# Patient Record
Sex: FEMALE | Race: WHITE | NOT HISPANIC OR LATINO | Employment: UNEMPLOYED | URBAN - METROPOLITAN AREA
[De-identification: names, ages, dates, MRNs, and addresses within clinical notes are randomized per-mention and may not be internally consistent; named-entity substitution may affect disease eponyms.]

---

## 2020-09-21 ENCOUNTER — TRANSCRIBE ORDERS (OUTPATIENT)
Dept: OBSTETRICS AND GYNECOLOGY | Facility: HOSPITAL | Age: 35
End: 2020-09-21

## 2020-09-21 DIAGNOSIS — O35.EXX0 FETAL RENAL ANOMALY, SINGLE GESTATION: Primary | ICD-10-CM

## 2020-09-21 DIAGNOSIS — O09.529 AMA (ADVANCED MATERNAL AGE) MULTIGRAVIDA 35+, UNSPECIFIED TRIMESTER: ICD-10-CM

## 2020-09-21 DIAGNOSIS — O09.899 HISTORY OF PRETERM DELIVERY, CURRENTLY PREGNANT, UNSPECIFIED TRIMESTER: ICD-10-CM

## 2020-10-02 ENCOUNTER — OFFICE VISIT (OUTPATIENT)
Dept: OBSTETRICS AND GYNECOLOGY | Facility: HOSPITAL | Age: 35
End: 2020-10-02

## 2020-10-02 ENCOUNTER — HOSPITAL ENCOUNTER (OUTPATIENT)
Dept: WOMENS IMAGING | Facility: HOSPITAL | Age: 35
Discharge: HOME | End: 2020-10-02
Admitting: NURSE PRACTITIONER

## 2020-10-02 VITALS
DIASTOLIC BLOOD PRESSURE: 66 MMHG | BODY MASS INDEX: 31.48 KG/M2 | WEIGHT: 184.4 LBS | SYSTOLIC BLOOD PRESSURE: 121 MMHG | HEIGHT: 64 IN

## 2020-10-02 DIAGNOSIS — O35.EXX0 FETAL RENAL ANOMALY, SINGLE GESTATION: ICD-10-CM

## 2020-10-02 DIAGNOSIS — O09.529 AMA (ADVANCED MATERNAL AGE) MULTIGRAVIDA 35+, UNSPECIFIED TRIMESTER: ICD-10-CM

## 2020-10-02 DIAGNOSIS — O35.EXX0 FETAL MULTICYSTIC DYSPLASTIC KIDNEY AFFECTING CARE OF MOTHER, ANTEPARTUM, SINGLE OR UNSPECIFIED FETUS: ICD-10-CM

## 2020-10-02 DIAGNOSIS — O09.899 HISTORY OF PRETERM DELIVERY, CURRENTLY PREGNANT, UNSPECIFIED TRIMESTER: ICD-10-CM

## 2020-10-02 DIAGNOSIS — O09.523 MULTIGRAVIDA OF ADVANCED MATERNAL AGE IN THIRD TRIMESTER: Primary | ICD-10-CM

## 2020-10-02 PROCEDURE — 76811 OB US DETAILED SNGL FETUS: CPT | Performed by: OBSTETRICS & GYNECOLOGY

## 2020-10-02 PROCEDURE — 76811 OB US DETAILED SNGL FETUS: CPT

## 2020-10-02 PROCEDURE — 99241 PR OFFICE CONSULTATION NEW/ESTAB PATIENT 15 MIN: CPT | Performed by: OBSTETRICS & GYNECOLOGY

## 2020-10-02 RX ORDER — NITROFURANTOIN 25; 75 MG/1; MG/1
CAPSULE ORAL 2 TIMES DAILY
COMMUNITY
Start: 2020-09-23 | End: 2020-10-30

## 2020-10-02 RX ORDER — PROMETHAZINE HYDROCHLORIDE 25 MG/1
25 TABLET ORAL EVERY 6 HOURS PRN
COMMUNITY
Start: 2020-09-18

## 2020-10-02 NOTE — PROGRESS NOTES
Documentation of the ultasound findings, images, and interpretations as well as consultation note will be available in the patient's Viewpoint report located in the Chart Review Imaging tab in [facility].

## 2020-10-02 NOTE — PROGRESS NOTES
Denies problems. Reports had NIPS with normal results. Next OB visit is 10/9/2020. Uncertain historian.

## 2020-10-30 ENCOUNTER — HOSPITAL ENCOUNTER (OUTPATIENT)
Dept: WOMENS IMAGING | Facility: HOSPITAL | Age: 35
Discharge: HOME | End: 2020-10-30
Admitting: OBSTETRICS & GYNECOLOGY

## 2020-10-30 ENCOUNTER — OFFICE VISIT (OUTPATIENT)
Dept: OBSTETRICS AND GYNECOLOGY | Facility: HOSPITAL | Age: 35
End: 2020-10-30

## 2020-10-30 VITALS — DIASTOLIC BLOOD PRESSURE: 73 MMHG | WEIGHT: 190 LBS | SYSTOLIC BLOOD PRESSURE: 127 MMHG | BODY MASS INDEX: 33.13 KG/M2

## 2020-10-30 DIAGNOSIS — O09.523 MULTIGRAVIDA OF ADVANCED MATERNAL AGE IN THIRD TRIMESTER: ICD-10-CM

## 2020-10-30 DIAGNOSIS — O35.EXX0 FETAL MULTICYSTIC DYSPLASTIC KIDNEY AFFECTING CARE OF MOTHER, ANTEPARTUM, SINGLE OR UNSPECIFIED FETUS: Primary | ICD-10-CM

## 2020-10-30 DIAGNOSIS — O35.EXX0 FETAL MULTICYSTIC DYSPLASTIC KIDNEY AFFECTING CARE OF MOTHER, ANTEPARTUM, SINGLE OR UNSPECIFIED FETUS: ICD-10-CM

## 2020-10-30 PROCEDURE — 76816 OB US FOLLOW-UP PER FETUS: CPT | Performed by: OBSTETRICS & GYNECOLOGY

## 2020-10-30 PROCEDURE — 76816 OB US FOLLOW-UP PER FETUS: CPT

## 2020-10-30 NOTE — PROGRESS NOTES
Documentation of the ultasound findings, images, and interpretations will be available in the patient's Viewpoint report located in the Chart Review Imaging tab in [facility].

## 2020-10-30 NOTE — PROGRESS NOTES
Pt denies lof, vag bleeding or cramping. States she has been taking motrin occasionally. Told pt to discuss this with provider. Sees OB 11/4.

## 2025-01-14 ENCOUNTER — TELEPHONE (OUTPATIENT)
Dept: CARDIOLOGY | Facility: CLINIC | Age: 40
End: 2025-01-14
Payer: MEDICAID

## 2025-01-14 NOTE — TELEPHONE ENCOUNTER
Called pt to [facility] cancelled appt and the pt stated that she did not need to see a cardiologist that the issue was her lungs, pt stated that she was going to get an appointment with pulmonology and did not want to reschedule the appt that was cancelled

## 2025-02-03 ENCOUNTER — PATIENT ROUNDING (BHMG ONLY) (OUTPATIENT)
Dept: CARDIOLOGY | Facility: CLINIC | Age: 40
End: 2025-02-03
Payer: MEDICAID

## 2025-02-03 ENCOUNTER — OFFICE VISIT (OUTPATIENT)
Dept: CARDIOLOGY | Facility: CLINIC | Age: 40
End: 2025-02-03
Payer: MEDICAID

## 2025-02-03 VITALS
OXYGEN SATURATION: 98 % | DIASTOLIC BLOOD PRESSURE: 78 MMHG | HEIGHT: 64 IN | WEIGHT: 164.6 LBS | BODY MASS INDEX: 28.1 KG/M2 | HEART RATE: 68 BPM | SYSTOLIC BLOOD PRESSURE: 116 MMHG

## 2025-02-03 DIAGNOSIS — R07.2 PRECORDIAL PAIN: Primary | ICD-10-CM

## 2025-02-03 DIAGNOSIS — R00.2 PALPITATIONS: ICD-10-CM

## 2025-02-03 DIAGNOSIS — I10 PRIMARY HYPERTENSION: ICD-10-CM

## 2025-02-03 PROCEDURE — 99204 OFFICE O/P NEW MOD 45 MIN: CPT | Performed by: NURSE PRACTITIONER

## 2025-02-03 PROCEDURE — 93000 ELECTROCARDIOGRAM COMPLETE: CPT | Performed by: NURSE PRACTITIONER

## 2025-02-03 PROCEDURE — 1160F RVW MEDS BY RX/DR IN RCRD: CPT | Performed by: NURSE PRACTITIONER

## 2025-02-03 PROCEDURE — 1159F MED LIST DOCD IN RCRD: CPT | Performed by: NURSE PRACTITIONER

## 2025-02-03 RX ORDER — SUMATRIPTAN 50 MG/1
50 TABLET, FILM COATED ORAL ONCE AS NEEDED
COMMUNITY

## 2025-02-03 RX ORDER — IBUPROFEN 800 MG/1
1 TABLET, FILM COATED ORAL EVERY 12 HOURS SCHEDULED
COMMUNITY
Start: 2025-01-15

## 2025-02-03 RX ORDER — DIAZEPAM 5 MG/1
1 TABLET ORAL DAILY
COMMUNITY
Start: 2025-01-15

## 2025-02-03 RX ORDER — DICLOFENAC SODIUM 75 MG/1
75 TABLET, DELAYED RELEASE ORAL 2 TIMES DAILY
COMMUNITY
Start: 2025-01-27

## 2025-02-03 RX ORDER — NITROGLYCERIN 0.4 MG/1
TABLET SUBLINGUAL
Qty: 30 TABLET | Refills: 5 | Status: SHIPPED | OUTPATIENT
Start: 2025-02-03

## 2025-02-03 RX ORDER — LISINOPRIL 20 MG/1
40 TABLET ORAL DAILY
COMMUNITY

## 2025-02-03 RX ORDER — AMLODIPINE BESYLATE 10 MG/1
1 TABLET ORAL DAILY
COMMUNITY
Start: 2025-01-23

## 2025-02-03 RX ORDER — OMEGA-3 FATTY ACIDS/FISH OIL 300-1000MG
1000 CAPSULE ORAL NIGHTLY PRN
COMMUNITY

## 2025-02-03 NOTE — PROGRESS NOTES
February 3, 2025    Hello, may I speak with Marisela Hinkle?    My name is JESIKA NORTON      I am  with [facility]  [address] [place] [id number] [phone].    Before we get started may I verify your date of birth? 1985    I am calling to officially welcome you to our practice and ask about your recent visit. Is this a good time to talk? NO.  PT WAS IN THE STORE    LEFT MESSAGE FOR PT TO RETURN CALL AND ASK TO SPEAK WITH JESIKA.      Thank you, and have a great day.

## 2025-02-03 NOTE — PROGRESS NOTES
Subjective     Marisela Hinkle is a 39 y.o. female who presents to day for Chest Pain (In both sides of chest and in center of chest also has tingling or numbness in left arm).    CHIEF COMPLIANT  Chief Complaint   Patient presents with    Chest Pain     In both sides of chest and in center of chest also has tingling or numbness in left arm       Active Problems:  Problem List Items Addressed This Visit    None  Visit Diagnoses       Precordial pain    -  Primary    Relevant Orders    Adult Transthoracic Echo Complete W/ Cont if Necessary Per Protocol    Treadmill Stress Test    Palpitations        Relevant Orders    Adult Transthoracic Echo Complete W/ Cont if Necessary Per Protocol    Treadmill Stress Test    Primary hypertension        Relevant Medications    amLODIPine (NORVASC) 10 MG tablet    lisinopril (PRINIVIL,ZESTRIL) 20 MG tablet    Other Relevant Orders    Adult Transthoracic Echo Complete W/ Cont if Necessary Per Protocol    Treadmill Stress Test        Problem list  1.  Chest pain  2.  Palpitations  3.  Chronic arterial hypertension  HPI  HPI  Ms. Marisela Hinkle is a 39-year-old female patient who is being seen today for cardiac evaluation.    Patient does report chest pain that occurs on a daily basis.  She says its midsternal and usually to the left side of her chest down her left arm which will go numb and tingle.  She also has a go up into her neck and jaw at times.  She also says that it can be on the right side of her chest.  She says it is more of a constant like pain with changes in the intensity.  Never really goes away.  She says it sort of feels like something sitting on her chest almost agonizing.  She says it almost feels like a menstrual cramp.  She does report that positioning does seem to make it worse at times.  Is not truly associated with activity though.  She says he walks on the treadmill for about 30 minutes every day and right after she will break out in a cold sweat but 1 to 2  hours later she feels better than she normally does.  She was seen in the ER however these records are unavailable to us at this time.  She was seen December 24 at [facility] and was prescribed Valium and ibuprofen 800.  She also says at 1 point she did receive nitroglycerin which did seem to help.  Overall this is been going on 3 to 4 months.    Patient does have some intermittent palpitations where she has a racing like sensation in her chest usually occurs with the chest pain.  On a frequency of about 2-3 times a week.    She also reports that coffee, tanning bed, and BC powder seems to worsen her pain.    We did review her most recent labs done by her PCP.  Her CBC CMP were relatively normal.  HDL was 70, triglycerides 110, .  She reports that she had a repeat lab and her LDL was normal.  Also previous lab work showed a normal D-dimer.  We did review these today in the office.    Patient does report fatigue where she is tired all the time.    Patient does have chronic arterial hypertension in which her blood pressure is 116/78 heart rate of 68.  She is currently being treated with amlodipine and lisinopril.  PRIOR MEDS  Current Outpatient Medications on File Prior to Visit   Medication Sig Dispense Refill    amLODIPine (NORVASC) 10 MG tablet Take 1 tablet by mouth Daily.      aspirin-acetaminophen-caffeine (EXCEDRIN MIGRAINE) 250-250-65 MG per tablet Take 1 tablet by mouth Every 6 (Six) Hours As Needed for Headache.      B Complex Vitamins (VITAMIN-B COMPLEX PO) Take  by mouth.      diazePAM (VALIUM) 5 MG tablet Take 1 tablet by mouth Daily.      diclofenac (VOLTAREN) 75 MG EC tablet Take 1 tablet by mouth 2 (Two) Times a Day.      ibuprofen (ADVIL,MOTRIN) 800 MG tablet Take 1 tablet by mouth Every 12 (Twelve) Hours.      lisinopril (PRINIVIL,ZESTRIL) 20 MG tablet Take 2 tablets by mouth Daily.      Omega 3 1000 MG capsule Take 1,000 mg by mouth At Night As Needed.      promethazine (PHENERGAN) 25 MG  tablet Take 1 tablet by mouth Every 6 (Six) Hours As Needed.      SUMAtriptan (IMITREX) 50 MG tablet Take 1 tablet by mouth 1 (One) Time As Needed for Migraine.       No current facility-administered medications on file prior to visit.       ALLERGIES  Patient has no known allergies.    HISTORY  Past Medical History:   Diagnosis Date    Hypertension     Migraines        Social History     Socioeconomic History    Marital status: Single   Tobacco Use    Smoking status: Never    Smokeless tobacco: Never   Vaping Use    Vaping status: Never Used   Substance and Sexual Activity    Alcohol use: Never    Drug use: Never    Sexual activity: Defer     Partners: Male       Family History   Problem Relation Age of Onset    Hypertension Mother     Cancer Maternal Grandmother     Cancer Maternal Grandfather     Other (tumor) Daughter        Review of Systems   Constitutional:  Positive for fatigue and fever (not really fever  but gets hot at night but feels cold has night sweats  also). Negative for chills.   HENT:  Negative for congestion, rhinorrhea and sore throat.    Eyes:  Positive for visual disturbance (wears readers to read).   Respiratory:  Positive for chest tightness (comes and goes feels like something is sitting on her chest). Negative for apnea and shortness of breath.    Cardiovascular:  Positive for chest pain (pain in center of sometimes goes to either side when on left side og the chest  her left arm tingles or goes numb) and palpitations (flutters). Negative for leg swelling.   Gastrointestinal:  Positive for constipation, diarrhea and nausea.   Musculoskeletal:  Positive for back pain (lower back). Negative for arthralgias and neck pain.   Skin:  Negative for rash and wound.   Allergic/Immunologic: Positive for environmental allergies. Negative for food allergies.   Neurological:  Negative for dizziness, syncope, weakness and light-headedness.   Hematological:  Bruises/bleeds easily (bruise easy).  "  Psychiatric/Behavioral:  Positive for sleep disturbance (Has a 4 year old  with tumor and is blind does not get much sleep).        Objective     VITALS: /78 (BP Location: Left arm, Patient Position: Sitting, Cuff Size: Adult)   Pulse 68   Ht 162.6 cm (64\")   Wt 74.7 kg (164 lb 9.6 oz)   SpO2 98%   BMI 28.25 kg/m²     LABS:   Lab Results (most recent)       None            IMAGING:   No Images in the past 120 days found..    EXAM:  Physical Exam  Vitals and nursing note reviewed.   Constitutional:       Appearance: She is well-developed.   HENT:      Head: Normocephalic.   Neck:      Thyroid: No thyroid mass.      Vascular: No carotid bruit or JVD.      Trachea: Trachea and phonation normal.   Cardiovascular:      Rate and Rhythm: Normal rate and regular rhythm.      Pulses:           Radial pulses are 2+ on the right side and 2+ on the left side.        Posterior tibial pulses are 2+ on the right side and 2+ on the left side.      Heart sounds: Normal heart sounds. No murmur heard.     No friction rub. No gallop.   Pulmonary:      Effort: Pulmonary effort is normal. No respiratory distress.      Breath sounds: Normal breath sounds. No wheezing or rales.   Musculoskeletal:         General: No swelling. Normal range of motion.      Cervical back: Neck supple.   Skin:     General: Skin is warm and dry.      Capillary Refill: Capillary refill takes less than 2 seconds.      Findings: No rash.   Neurological:      Mental Status: She is alert and oriented to person, place, and time.   Psychiatric:         Speech: Speech normal.         Behavior: Behavior normal.         Thought Content: Thought content normal.         Judgment: Judgment normal.         Procedure     ECG 12 Lead    Date/Time: 2/3/2025 9:51 AM  Performed by: Luis Felipe Marin APRN    Authorized by: Luis Felipe Marin APRN  Previous ECG: no previous ECG available  Rhythm: sinus rhythm  Rate: normal  BPM: 71  QRS axis: normal  Comments: QTc 439 " ms  No acute changes             Assessment & Plan    Diagnosis Plan   1. Precordial pain  Adult Transthoracic Echo Complete W/ Cont if Necessary Per Protocol    Treadmill Stress Test      2. Palpitations  Adult Transthoracic Echo Complete W/ Cont if Necessary Per Protocol    Treadmill Stress Test      3. Primary hypertension  Adult Transthoracic Echo Complete W/ Cont if Necessary Per Protocol    Treadmill Stress Test      1.  Given patient's chest pain and palpitations that occur on a daily basis I do think it is appropriate to have her evaluated for ischemia including stress test and echocardiogram.  The echocardiogram will also help evaluate for heart failure as a potential cause of her symptoms.  2.  Patient's blood pressure is controlled on current blood pressure medication regimen.  No medication changes are warranted at this time.  Patient advised to monitor blood pressure on a daily basis and report any persistent highs or lows.  Set goal blood pressure for patient at 130/80 or below.  3.  We did discuss isosorbide for antianginal therapy given the fact that nitroglycerin did seem to help her chest pain.  However due to her migraines and headache that she got with the nitroglycerin she wishes to defer at this time.  4.  Informed of signs and symptoms of ACS and advised to seek emergent treatment for any new worsening symptoms.  Patient also advised sooner follow-up as needed.  Also advised to follow-up with family doctor as needed  This note is dictated utilizing voice recognition software.  Although this record has been proof read, transcriptional errors may still be present. If questions occur regarding the content of this record please do not hesitate to call our office.  I have reviewed and confirmed the accuracy of the ROS as documented by the MA/MAT/RN JORGE LUIS Molina    Return if symptoms worsen or fail to improve, for Next scheduled follow up.    Diagnoses and all orders for this visit:    1.  Precordial pain (Primary)  -     Adult Transthoracic Echo Complete W/ Cont if Necessary Per Protocol; Future  -     Treadmill Stress Test; Future    2. Palpitations  -     Adult Transthoracic Echo Complete W/ Cont if Necessary Per Protocol; Future  -     Treadmill Stress Test; Future    3. Primary hypertension  -     Adult Transthoracic Echo Complete W/ Cont if Necessary Per Protocol; Future  -     Treadmill Stress Test; Future    Other orders  -     ECG 12 Lead  -     nitroglycerin (NITROSTAT) 0.4 MG SL tablet; 1 under the tongue as needed for angina, may repeat q5mins for up three doses  Dispense: 30 tablet; Refill: 5        Marisela Hinkle  reports that she has never smoked. She has never used smokeless tobacco. I have educated her on the risk of diseases from using tobacco products. Patient does not smoke.          BMI cannot be calculated due to outdated height or weight values.  Please input a current height/weight in Vitals and re-renter [id number] in Note to pull in correct documentation based on BMI range.           MEDS ORDERED DURING VISIT:  New Medications Ordered This Visit   Medications    nitroglycerin (NITROSTAT) 0.4 MG SL tablet     Si under the tongue as needed for angina, may repeat q5mins for up three doses     Dispense:  30 tablet     Refill:  5           This document has been electronically signed by Luis Felipe Marin Jr., APRN  February 3, 2025 10:38 EST

## 2025-03-25 ENCOUNTER — HOSPITAL ENCOUNTER (OUTPATIENT)
Dept: CARDIOLOGY | Facility: HOSPITAL | Age: 40
Discharge: HOME | End: 2025-03-25
Admitting: NURSE PRACTITIONER
Payer: MEDICAID

## 2025-03-25 DIAGNOSIS — R00.2 PALPITATIONS: ICD-10-CM

## 2025-03-25 DIAGNOSIS — I10 PRIMARY HYPERTENSION: ICD-10-CM

## 2025-03-25 DIAGNOSIS — R07.2 PRECORDIAL PAIN: ICD-10-CM

## 2025-03-25 LAB
AV MEAN PRESS GRAD SYS DOP V1V2: 3.4 MMHG
AV VMAX SYS DOP: 122.2 CM/SEC
BH CV ECHO MEAS - ACS: 2.7 CM
BH CV ECHO MEAS - AO MAX PG: 6 MMHG
BH CV ECHO MEAS - AO ROOT DIAM: 3.5 CM
BH CV ECHO MEAS - AO V2 VTI: 29.1 CM
BH CV ECHO MEAS - EDV(CUBED): 60.2 ML
BH CV ECHO MEAS - EDV(MOD-SP4): 99.4 ML
BH CV ECHO MEAS - EF(MOD-SP4): 57.5 %
BH CV ECHO MEAS - ESV(CUBED): 17.4 ML
BH CV ECHO MEAS - ESV(MOD-SP4): 42.2 ML
BH CV ECHO MEAS - FS: 33.9 %
BH CV ECHO MEAS - IVS/LVPW: 0.95 CM
BH CV ECHO MEAS - IVSD: 1.45 CM
BH CV ECHO MEAS - LA DIMENSION: 3.2 CM
BH CV ECHO MEAS - LAT PEAK E' VEL: 10.8 CM/SEC
BH CV ECHO MEAS - LV DIASTOLIC VOL/BSA (35-75): 55.3 CM2
BH CV ECHO MEAS - LV MASS(C)D: 222.6 GRAMS
BH CV ECHO MEAS - LV SYSTOLIC VOL/BSA (12-30): 23.5 CM2
BH CV ECHO MEAS - LVIDD: 3.9 CM
BH CV ECHO MEAS - LVIDS: 2.6 CM
BH CV ECHO MEAS - LVPWD: 1.52 CM
BH CV ECHO MEAS - MED PEAK E' VEL: 9.6 CM/SEC
BH CV ECHO MEAS - MV A MAX VEL: 67.7 CM/SEC
BH CV ECHO MEAS - MV DEC TIME: 0.3 SEC
BH CV ECHO MEAS - MV E MAX VEL: 84 CM/SEC
BH CV ECHO MEAS - MV E/A: 1.24
BH CV ECHO MEAS - RAP SYSTOLE: 10 MMHG
BH CV ECHO MEAS - RVDD: 3.1 CM
BH CV ECHO MEAS - RVSP: 21.9 MMHG
BH CV ECHO MEAS - SV(MOD-SP4): 57.2 ML
BH CV ECHO MEAS - SVI(MOD-SP4): 31.8 ML/M2
BH CV ECHO MEAS - TR MAX PG: 11.9 MMHG
BH CV ECHO MEAS - TR MAX VEL: 172.4 CM/SEC
BH CV ECHO MEASUREMENTS AVERAGE E/E' RATIO: 8.24
IVRT: 106 MS
LEFT ATRIUM VOLUME INDEX: 24.6 ML/M2
LV EF 3D SEGMENTATION: 65 %

## 2025-03-25 PROCEDURE — 93306 TTE W/DOPPLER COMPLETE: CPT

## 2025-07-08 ENCOUNTER — OFFICE VISIT (OUTPATIENT)
Dept: CARDIOLOGY | Facility: CLINIC | Age: 40
End: 2025-07-08
Payer: MEDICAID

## 2025-07-08 VITALS
WEIGHT: 173.2 LBS | HEART RATE: 58 BPM | SYSTOLIC BLOOD PRESSURE: 129 MMHG | HEIGHT: 64 IN | BODY MASS INDEX: 29.57 KG/M2 | DIASTOLIC BLOOD PRESSURE: 84 MMHG | OXYGEN SATURATION: 99 %

## 2025-07-08 DIAGNOSIS — I10 PRIMARY HYPERTENSION: ICD-10-CM

## 2025-07-08 DIAGNOSIS — R00.2 PALPITATIONS: ICD-10-CM

## 2025-07-08 DIAGNOSIS — R07.2 PRECORDIAL PAIN: Primary | ICD-10-CM

## 2025-07-08 PROCEDURE — 1159F MED LIST DOCD IN RCRD: CPT | Performed by: NURSE PRACTITIONER

## 2025-07-08 PROCEDURE — 1160F RVW MEDS BY RX/DR IN RCRD: CPT | Performed by: NURSE PRACTITIONER

## 2025-07-08 PROCEDURE — 99214 OFFICE O/P EST MOD 30 MIN: CPT | Performed by: NURSE PRACTITIONER

## 2025-07-08 RX ORDER — PANTOPRAZOLE SODIUM 40 MG/1
40 TABLET, DELAYED RELEASE ORAL DAILY PRN
COMMUNITY
Start: 2025-04-27

## 2025-07-08 RX ORDER — GABAPENTIN 300 MG/1
300 CAPSULE ORAL 3 TIMES DAILY
COMMUNITY
Start: 2025-06-16

## 2025-07-08 RX ORDER — LORAZEPAM 0.5 MG/1
0.5 TABLET ORAL
COMMUNITY
Start: 2025-06-16

## 2025-07-08 NOTE — PROGRESS NOTES
Subjective     Marisela Hinkle is a 40 y.o. female who presents to day for follow up on testing (Echo).    CHIEF COMPLIANT  Chief Complaint   Patient presents with    follow up on testing     Echo       Active Problems:  Problem List Items Addressed This Visit    None  Visit Diagnoses         Precordial pain    -  Primary    Relevant Orders    Stress Test With Myocardial Perfusion One Day    Holter Monitor - 72 Hour Up To 15 Days      Palpitations        Relevant Orders    Stress Test With Myocardial Perfusion One Day    Holter Monitor - 72 Hour Up To 15 Days      Primary hypertension        Relevant Orders    Stress Test With Myocardial Perfusion One Day    Holter Monitor - 72 Hour Up To 15 Days        Problem list  1.  Chest pain  2.  Palpitations  3.  Chronic arterial hypertension  3.1 echocardiogram 3/25: EF 60 to 65%, normal diastolic function, trivial TR, RVSP in the low 20s    HPI  HPI  Ms. Marisela Hinkle is a 40-year-old female patient who is being seen today for chronic arterial hypertension, chest pain, palpitations, and testing follow-up.    Patient does have chronic arterial hypertension in which she is being treated with amlodipine and lisinopril.  Today her blood pressure is 129/84 heart rate of 58.    Patient to go under testing including echocardiogram.  Patient's echocardiogram identified LV size, function, and wall motion are normal.  Mild concentric left ventricular hypertrophy.  Visually estimated ejection fraction is 60 to 65%.  Normal LV diastolic function and filling pressures.  The left atrium is at the upper limits of normal size.  Right heart chambers are normal.  No septal defect or intracavitary mass or thrombus. Valves are morphologically and physiologically normal.  There is trivial TR. No pericardial or great vessel pathology. Right heart systolic pressures are estimated in the low 20s.  We did discuss this in detail.  The patient reports that she is not able to do the treadmill portion  of his stress test due to significant knee pain on the right side.    Patient continues to have chest pain that occurs on a daily basis in which she reports that is almost constant.  Says it feels as if something is sitting on her chest and she has tingling across her chest and down her arms.  She says at times it can be sharp and burning as well.  It occurs frequently multiple times a day.  She does take Ativan and it seems to help.  She was  previously on gabapentin which also seemed to help.  She says it does change in intensity.  She can have associated diaphoresis at times.  she has been seen in the ER previously for chest pain And since several other times she has felt as if she needed to go to the ER    Patient also has intermittent palpitations in which she has a racing like sensation that occurs in her chest.  She says it is a strange like feeling and occurs on a daily basis which is increased in frequency from 2-3 times a week previously.  She says it just makes her have this really strange feeling as she can tell that something is not right.    Patient does have advanced fatigue where she is tired all the time.  Overall she just feels really awful and knows that there is something going on.    Patient denies any  shortness of breath, lower extremity edema, syncope, or strokelike symptoms.  PRIOR MEDS  Current Outpatient Medications on File Prior to Visit   Medication Sig Dispense Refill    amLODIPine (NORVASC) 10 MG tablet Take 1 tablet by mouth Daily.      aspirin-acetaminophen-caffeine (EXCEDRIN MIGRAINE) 250-250-65 MG per tablet Take 1 tablet by mouth Every 6 (Six) Hours As Needed for Headache.      gabapentin (NEURONTIN) 300 MG capsule Take 1 capsule by mouth 3 (Three) Times a Day.      ibuprofen (ADVIL,MOTRIN) 800 MG tablet Take 1 tablet by mouth Every 12 (Twelve) Hours.      lisinopril (PRINIVIL,ZESTRIL) 40 MG tablet Take 1 tablet by mouth Daily.      LORazepam (ATIVAN) 0.5 MG tablet Take 1 tablet  by mouth. 1 tab in am  2 tabs pm      nitroglycerin (NITROSTAT) 0.4 MG SL tablet 1 under the tongue as needed for angina, may repeat q5mins for up three doses 30 tablet 5    pantoprazole (PROTONIX) 40 MG EC tablet Take 1 tablet by mouth Daily As Needed.      promethazine (PHENERGAN) 25 MG tablet Take 1 tablet by mouth Every 6 (Six) Hours As Needed.      B Complex Vitamins (VITAMIN-B COMPLEX PO) Take  by mouth.      diazePAM (VALIUM) 5 MG tablet Take 1 tablet by mouth Daily.      diclofenac (VOLTAREN) 75 MG EC tablet Take 1 tablet by mouth 2 (Two) Times a Day.      Omega 3 1000 MG capsule Take 1,000 mg by mouth At Night As Needed.      SUMAtriptan (IMITREX) 50 MG tablet Take 1 tablet by mouth 1 (One) Time As Needed for Migraine.       No current facility-administered medications on file prior to visit.       ALLERGIES  Patient has no known allergies.    HISTORY  Past Medical History:   Diagnosis Date    Hypertension     Migraines        Social History     Socioeconomic History    Marital status: Single   Tobacco Use    Smoking status: Never    Smokeless tobacco: Never   Vaping Use    Vaping status: Never Used   Substance and Sexual Activity    Alcohol use: Never    Drug use: Never    Sexual activity: Defer     Partners: Male     Family History   Problem Relation Age of Onset    Hypertension Mother     Cancer Maternal Grandmother     Cancer Maternal Grandfather     Other (tumor) Daughter        Review of Systems   Constitutional:  Positive for fatigue (constantly tired).   Respiratory:  Positive for chest tightness (feels like there is something sitting on chest). Negative for apnea and shortness of breath.    Cardiovascular:  Positive for chest pain (sometimes it is a burning sensation other times it is sharp pain) and palpitations (has a strange sensation in chest  racing). Negative for leg swelling.   Gastrointestinal:  Positive for nausea. Negative for constipation and diarrhea.   Neurological:  Positive for  "light-headedness. Negative for dizziness, syncope and weakness.   Hematological:  Bruises/bleeds easily (bruise).   Psychiatric/Behavioral:  Negative for sleep disturbance (NO SOB is up with her daughter at night).        Objective     VITALS: /84 (BP Location: Left arm, Patient Position: Sitting, Cuff Size: Adult)   Pulse 58   Ht 162.6 cm (64.02\")   Wt 78.6 kg (173 lb 3.2 oz)   SpO2 99%   Breastfeeding No   BMI 29.71 kg/m²     LABS:   Lab Results (most recent)       None            IMAGING:   No Images in the past 120 days found..    EXAM:  Physical Exam  Vitals and nursing note reviewed.   Constitutional:       Appearance: She is well-developed.   HENT:      Head: Normocephalic.   Neck:      Thyroid: No thyroid mass.      Vascular: No carotid bruit or JVD.      Trachea: Trachea and phonation normal.   Cardiovascular:      Rate and Rhythm: Normal rate and regular rhythm.      Pulses:           Radial pulses are 2+ on the right side and 2+ on the left side.        Posterior tibial pulses are 2+ on the right side and 2+ on the left side.      Heart sounds: Normal heart sounds. No murmur heard.     No friction rub. No gallop.   Pulmonary:      Effort: Pulmonary effort is normal. No respiratory distress.      Breath sounds: Normal breath sounds. No wheezing or rales.   Musculoskeletal:         General: No swelling. Normal range of motion.      Cervical back: Neck supple.   Skin:     General: Skin is warm and dry.      Capillary Refill: Capillary refill takes less than 2 seconds.      Findings: No rash.   Neurological:      Mental Status: She is alert and oriented to person, place, and time.   Psychiatric:         Speech: Speech normal.         Behavior: Behavior normal.         Thought Content: Thought content normal.         Judgment: Judgment normal.         Procedure   Procedures       Assessment & Plan    Diagnosis Plan   1. Precordial pain  Stress Test With Myocardial Perfusion One Day    Holter " Monitor - 72 Hour Up To 15 Days      2. Palpitations  Stress Test With Myocardial Perfusion One Day    Holter Monitor - 72 Hour Up To 15 Days      3. Primary hypertension  Stress Test With Myocardial Perfusion One Day    Holter Monitor - 72 Hour Up To 15 Days      1.  Patient continues to have frequent and almost constant chest pain despite antianginal therapy of amlodipine.  Due to patient's multitude of symptoms of chest pain and palpitations and fatigue and malaise I do think it is appropriate to have her evaluated for ischemia. Previously was scheduled for treadmill stress test but was not completed due to significant right knee pain in which she feels as if she cannot complete the treadmill portion of the stress test.  She is in agreements to move forth with a pharmaceutical nuclear stress test.  2.  Previously offered patient isosorbide for antianginal therapy.  But due to her frequent headaches and migraines she opted to defer previously.  We will continue the amlodipine at this time.  3.  Patient's blood pressure is controlled on current blood pressure medication regimen.  No medication changes are warranted at this time.  Patient advised to monitor blood pressure on a daily basis and report any persistent highs or lows.  Set goal blood pressure for patient at 130/80 or below.  4.  Due to patient's ongoing palpitations we will have her wear a Holter monitor for 14 days determine the etiology of her palpitations and other potential causes of her symptoms.  Due to the fact that her symptoms do not occur on a daily basis and only occur a couple times a week I do not think a shorter term monitor would be appropriate and efficient catching the etiology of her palpitations.  5.  Informed of signs and symptoms of ACS and advised to seek emergent treatment for any new worsening symptoms.  Patient also advised sooner follow-up as needed.  Also advised to follow-up with family doctor as needed  This note is dictated  utilizing voice recognition software.  Although this record has been proof read, transcriptional errors may still be present. If questions occur regarding the content of this record please do not hesitate to call our office.  I have reviewed and confirmed the accuracy of the ROS as documented by the MA/LPN/RN JORGE LUIS Molina    Return in about 3 months (around 10/8/2025), or if symptoms worsen or fail to improve, for Requested to follow up with Dr. Ortega.    Diagnoses and all orders for this visit:    1. Precordial pain (Primary)  -     Stress Test With Myocardial Perfusion One Day; Future  -     Holter Monitor - 72 Hour Up To 15 Days; Future    2. Palpitations  -     Stress Test With Myocardial Perfusion One Day; Future  -     Holter Monitor - 72 Hour Up To 15 Days; Future    3. Primary hypertension  -     Stress Test With Myocardial Perfusion One Day; Future  -     Holter Monitor - 72 Hour Up To 15 Days; Future        Marisela Hinkle  reports that she has never smoked. She has never used smokeless tobacco. I have educated her on the risk of diseases from using tobacco products. Patient does not smoke.         BMI is >= 25 and <30. (Overweight) The following options were offered after discussion;: exercise counseling/recommendations and nutrition counseling/recommendations           MEDS ORDERED DURING VISIT:  No orders of the defined types were placed in this encounter.          This document has been electronically signed by JORGE LUIS Molina Jr.  July 8, 2025 18:05 EDT